# Patient Record
Sex: FEMALE | Race: WHITE | Employment: PART TIME | URBAN - METROPOLITAN AREA
[De-identification: names, ages, dates, MRNs, and addresses within clinical notes are randomized per-mention and may not be internally consistent; named-entity substitution may affect disease eponyms.]

---

## 2017-05-10 ENCOUNTER — HOSPITAL ENCOUNTER (OUTPATIENT)
Dept: VASCULAR LAB | Age: 67
Discharge: HOME | End: 2017-05-10
Payer: MEDICARE

## 2017-05-10 PROCEDURE — 93926 LOWER EXTREMITY STUDY: CPT

## 2017-05-10 PROCEDURE — 93924 LWR XTR VASC STDY BILAT: CPT

## 2018-01-05 ENCOUNTER — HOSPITAL ENCOUNTER (OUTPATIENT)
Dept: VASCULAR LAB | Age: 68
Discharge: HOME | End: 2018-01-05
Payer: MEDICARE

## 2018-01-05 PROCEDURE — 93926 LOWER EXTREMITY STUDY: CPT

## 2018-01-05 PROCEDURE — 93923 UPR/LXTR ART STDY 3+ LVLS: CPT

## 2018-01-05 PROCEDURE — 93880 EXTRACRANIAL BILAT STUDY: CPT

## 2018-02-20 ENCOUNTER — HOSPITAL ENCOUNTER (EMERGENCY)
Age: 68
Discharge: HOME | End: 2018-02-20
Attending: EMERGENCY MEDICINE
Payer: MEDICARE

## 2018-02-20 ENCOUNTER — APPOINTMENT (OUTPATIENT)
Dept: GENERAL RADIOLOGY | Age: 68
End: 2018-02-20
Payer: MEDICARE

## 2018-02-20 VITALS
HEART RATE: 90 BPM | SYSTOLIC BLOOD PRESSURE: 135 MMHG | BODY MASS INDEX: 37.05 KG/M2 | WEIGHT: 209.13 LBS | HEIGHT: 63 IN | OXYGEN SATURATION: 96 % | RESPIRATION RATE: 18 BRPM | DIASTOLIC BLOOD PRESSURE: 63 MMHG | TEMPERATURE: 97.3 F

## 2018-02-20 DIAGNOSIS — M25.561 ACUTE PAIN OF RIGHT KNEE: Primary | ICD-10-CM

## 2018-02-20 PROCEDURE — 93971 EXTREMITY STUDY: CPT

## 2018-02-20 PROCEDURE — 99284 EMERGENCY DEPT VISIT MOD MDM: CPT

## 2018-02-20 PROCEDURE — 73562 X-RAY EXAM OF KNEE 3: CPT

## 2018-02-20 RX ORDER — METOPROLOL SUCCINATE 50 MG/1
50 TABLET, EXTENDED RELEASE ORAL DAILY
COMMUNITY
End: 2022-03-15

## 2018-02-20 RX ORDER — PIOGLITAZONEHYDROCHLORIDE 45 MG/1
45 TABLET ORAL DAILY
COMMUNITY
End: 2022-03-15

## 2018-02-20 ASSESSMENT — ENCOUNTER SYMPTOMS
COUGH: 1
WHEEZING: 0
COLOR CHANGE: 0
SHORTNESS OF BREATH: 0

## 2018-02-20 ASSESSMENT — PAIN SCALES - GENERAL: PAINLEVEL_OUTOF10: 8

## 2018-02-20 ASSESSMENT — PAIN DESCRIPTION - PAIN TYPE: TYPE: ACUTE PAIN

## 2018-02-20 ASSESSMENT — PAIN DESCRIPTION - ORIENTATION: ORIENTATION: RIGHT;POSTERIOR

## 2018-02-20 ASSESSMENT — PAIN DESCRIPTION - LOCATION: LOCATION: KNEE

## 2018-02-20 ASSESSMENT — PAIN DESCRIPTION - DESCRIPTORS: DESCRIPTORS: STABBING

## 2018-02-20 NOTE — ED NOTES
Pt presents to er with c/o non-productive cough for past 2 weeks. Pt denies sob. Pt denies chest pain. Pt states she has pain to knee, pt denies injury. Pt states she has hx of dvt. Pt a&ox3. Skin warm and dry. Respirations even and non-labored.       Phoebe Wisdom RN  02/20/18 9003

## 2019-02-25 ENCOUNTER — HOSPITAL ENCOUNTER (OUTPATIENT)
Dept: VASCULAR LAB | Age: 69
Discharge: HOME | End: 2019-02-25
Payer: MEDICARE

## 2019-02-25 DIAGNOSIS — I77.9 PERIPHERAL ARTERIAL OCCLUSIVE DISEASE (HCC): Primary | ICD-10-CM

## 2019-02-25 DIAGNOSIS — I65.23 BILATERAL CAROTID ARTERY STENOSIS: ICD-10-CM

## 2019-02-25 PROCEDURE — 93923 UPR/LXTR ART STDY 3+ LVLS: CPT

## 2019-02-25 PROCEDURE — 93926 LOWER EXTREMITY STUDY: CPT

## 2019-02-25 PROCEDURE — 93880 EXTRACRANIAL BILAT STUDY: CPT

## 2020-08-28 ENCOUNTER — HOSPITAL ENCOUNTER (OUTPATIENT)
Dept: VASCULAR LAB | Age: 70
Discharge: HOME | End: 2020-08-28
Payer: MEDICARE

## 2020-08-28 PROCEDURE — 93926 LOWER EXTREMITY STUDY: CPT

## 2020-08-28 PROCEDURE — 93880 EXTRACRANIAL BILAT STUDY: CPT

## 2020-08-28 PROCEDURE — 93923 UPR/LXTR ART STDY 3+ LVLS: CPT

## 2022-01-17 ENCOUNTER — HOSPITAL ENCOUNTER (OUTPATIENT)
Dept: VASCULAR LAB | Age: 72
Discharge: HOME | End: 2022-01-17
Payer: MEDICARE

## 2022-01-17 DIAGNOSIS — I65.23 CAROTID STENOSIS, BILATERAL: ICD-10-CM

## 2022-01-17 DIAGNOSIS — I77.9 PERIPHERAL ARTERIAL OCCLUSIVE DISEASE (HCC): Primary | ICD-10-CM

## 2022-01-17 DIAGNOSIS — I73.9 INTERMITTENT CLAUDICATION (HCC): ICD-10-CM

## 2022-01-17 PROCEDURE — 93925 LOWER EXTREMITY STUDY: CPT

## 2022-01-17 PROCEDURE — 93923 UPR/LXTR ART STDY 3+ LVLS: CPT

## 2022-01-17 PROCEDURE — 93880 EXTRACRANIAL BILAT STUDY: CPT

## 2022-01-17 PROCEDURE — 93922 UPR/L XTREMITY ART 2 LEVELS: CPT

## 2022-03-15 ENCOUNTER — HOSPITAL ENCOUNTER (OUTPATIENT)
Dept: INTERVENTIONAL RADIOLOGY/VASCULAR | Age: 72
Discharge: HOME | End: 2022-03-17
Attending: SURGERY | Admitting: SURGERY
Payer: MEDICARE

## 2022-03-15 VITALS
RESPIRATION RATE: 16 BRPM | WEIGHT: 196 LBS | DIASTOLIC BLOOD PRESSURE: 71 MMHG | HEIGHT: 63 IN | HEART RATE: 86 BPM | SYSTOLIC BLOOD PRESSURE: 139 MMHG | BODY MASS INDEX: 34.73 KG/M2 | TEMPERATURE: 98.1 F | OXYGEN SATURATION: 96 %

## 2022-03-15 DIAGNOSIS — Z98.890: ICD-10-CM

## 2022-03-15 DIAGNOSIS — I73.9 CLAUDICATION (HCC): ICD-10-CM

## 2022-03-15 LAB
BUN BLDV-MCNC: 33 MG/DL (ref 8–23)
CREAT SERPL-MCNC: 1.08 MG/DL (ref 0.5–0.9)
GFR AFRICAN AMERICAN: >60 ML/MIN
GFR NON-AFRICAN AMERICAN: 50 ML/MIN
GFR SERPL CREATININE-BSD FRML MDRD: ABNORMAL ML/MIN/{1.73_M2}
HCT VFR BLD CALC: 42.5 % (ref 36.3–47.1)
HEMOGLOBIN: 14.4 G/DL (ref 11.9–15.1)
INR BLD: 1
MCH RBC QN AUTO: 31.2 PG (ref 25.2–33.5)
MCHC RBC AUTO-ENTMCNC: 33.9 G/DL (ref 28.4–34.8)
MCV RBC AUTO: 92.2 FL (ref 82.6–102.9)
NRBC AUTOMATED: 0 PER 100 WBC
PARTIAL THROMBOPLASTIN TIME: 29.7 SEC (ref 23.9–33.8)
PDW BLD-RTO: 13 % (ref 11.8–14.4)
PLATELET # BLD: 207 K/UL (ref 138–453)
PMV BLD AUTO: 11.1 FL (ref 8.1–13.5)
PROTHROMBIN TIME: 12.9 SEC (ref 11.5–14.2)
RBC # BLD: 4.61 M/UL (ref 3.95–5.11)
WBC # BLD: 8.3 K/UL (ref 3.5–11.3)

## 2022-03-15 PROCEDURE — 99152 MOD SED SAME PHYS/QHP 5/>YRS: CPT

## 2022-03-15 PROCEDURE — 2500000003 HC RX 250 WO HCPCS: Performed by: SURGERY

## 2022-03-15 PROCEDURE — 7100000010 HC PHASE II RECOVERY - FIRST 15 MIN

## 2022-03-15 PROCEDURE — 84520 ASSAY OF UREA NITROGEN: CPT

## 2022-03-15 PROCEDURE — 7100000011 HC PHASE II RECOVERY - ADDTL 15 MIN

## 2022-03-15 PROCEDURE — 99153 MOD SED SAME PHYS/QHP EA: CPT

## 2022-03-15 PROCEDURE — 2580000003 HC RX 258: Performed by: SURGERY

## 2022-03-15 PROCEDURE — 6360000002 HC RX W HCPCS: Performed by: SURGERY

## 2022-03-15 PROCEDURE — C1769 GUIDE WIRE: HCPCS

## 2022-03-15 PROCEDURE — 75625 CONTRAST EXAM ABDOMINL AORTA: CPT

## 2022-03-15 PROCEDURE — 37225 HC FEM POP TERRITORY ATHERECTOMY: CPT

## 2022-03-15 PROCEDURE — 85027 COMPLETE CBC AUTOMATED: CPT

## 2022-03-15 PROCEDURE — 75774 ARTERY X-RAY EACH VESSEL: CPT

## 2022-03-15 PROCEDURE — 75716 ARTERY X-RAYS ARMS/LEGS: CPT

## 2022-03-15 PROCEDURE — 6360000004 HC RX CONTRAST MEDICATION: Performed by: SURGERY

## 2022-03-15 PROCEDURE — 85730 THROMBOPLASTIN TIME PARTIAL: CPT

## 2022-03-15 PROCEDURE — 85610 PROTHROMBIN TIME: CPT

## 2022-03-15 PROCEDURE — 82565 ASSAY OF CREATININE: CPT

## 2022-03-15 RX ORDER — HYDROCODONE BITARTRATE AND ACETAMINOPHEN 5; 325 MG/1; MG/1
1 TABLET ORAL EVERY 4 HOURS PRN
Status: DISCONTINUED | OUTPATIENT
Start: 2022-03-15 | End: 2022-03-18 | Stop reason: HOSPADM

## 2022-03-15 RX ORDER — MORPHINE SULFATE 4 MG/ML
4 INJECTION, SOLUTION INTRAMUSCULAR; INTRAVENOUS
Status: DISCONTINUED | OUTPATIENT
Start: 2022-03-15 | End: 2022-03-18 | Stop reason: HOSPADM

## 2022-03-15 RX ORDER — HEPARIN SODIUM 5000 [USP'U]/ML
INJECTION, SOLUTION INTRAVENOUS; SUBCUTANEOUS
Status: COMPLETED | OUTPATIENT
Start: 2022-03-15 | End: 2022-03-15

## 2022-03-15 RX ORDER — ACETAMINOPHEN 325 MG/1
650 TABLET ORAL EVERY 4 HOURS PRN
Status: DISCONTINUED | OUTPATIENT
Start: 2022-03-15 | End: 2022-03-18 | Stop reason: HOSPADM

## 2022-03-15 RX ORDER — MIDAZOLAM HYDROCHLORIDE 1 MG/ML
INJECTION INTRAMUSCULAR; INTRAVENOUS
Status: COMPLETED | OUTPATIENT
Start: 2022-03-15 | End: 2022-03-15

## 2022-03-15 RX ORDER — MORPHINE SULFATE 2 MG/ML
2 INJECTION, SOLUTION INTRAMUSCULAR; INTRAVENOUS
Status: DISCONTINUED | OUTPATIENT
Start: 2022-03-15 | End: 2022-03-18 | Stop reason: HOSPADM

## 2022-03-15 RX ORDER — FENTANYL CITRATE 50 UG/ML
INJECTION, SOLUTION INTRAMUSCULAR; INTRAVENOUS
Status: COMPLETED | OUTPATIENT
Start: 2022-03-15 | End: 2022-03-15

## 2022-03-15 RX ORDER — IODIXANOL 320 MG/ML
INJECTION, SOLUTION INTRAVASCULAR
Status: COMPLETED | OUTPATIENT
Start: 2022-03-15 | End: 2022-03-15

## 2022-03-15 RX ORDER — HYDROCODONE BITARTRATE AND ACETAMINOPHEN 5; 325 MG/1; MG/1
2 TABLET ORAL EVERY 4 HOURS PRN
Status: DISCONTINUED | OUTPATIENT
Start: 2022-03-15 | End: 2022-03-18 | Stop reason: HOSPADM

## 2022-03-15 RX ORDER — ONDANSETRON 2 MG/ML
4 INJECTION INTRAMUSCULAR; INTRAVENOUS EVERY 8 HOURS PRN
Status: DISCONTINUED | OUTPATIENT
Start: 2022-03-15 | End: 2022-03-18 | Stop reason: HOSPADM

## 2022-03-15 RX ORDER — SODIUM CHLORIDE 450 MG/100ML
INJECTION, SOLUTION INTRAVENOUS CONTINUOUS
Status: DISCONTINUED | OUTPATIENT
Start: 2022-03-15 | End: 2022-03-18 | Stop reason: HOSPADM

## 2022-03-15 RX ORDER — PROTAMINE SULFATE 10 MG/ML
INJECTION, SOLUTION INTRAVENOUS
Status: COMPLETED | OUTPATIENT
Start: 2022-03-15 | End: 2022-03-15

## 2022-03-15 RX ORDER — LIDOCAINE HYDROCHLORIDE 10 MG/ML
INJECTION, SOLUTION INFILTRATION; PERINEURAL
Status: COMPLETED | OUTPATIENT
Start: 2022-03-15 | End: 2022-03-15

## 2022-03-15 RX ORDER — CARVEDILOL 12.5 MG/1
TABLET ORAL
COMMUNITY
Start: 2022-01-24

## 2022-03-15 RX ADMIN — MIDAZOLAM 1 MG: 1 INJECTION INTRAMUSCULAR; INTRAVENOUS at 11:02

## 2022-03-15 RX ADMIN — MIDAZOLAM 1 MG: 1 INJECTION INTRAMUSCULAR; INTRAVENOUS at 11:21

## 2022-03-15 RX ADMIN — PROTAMINE SULFATE 15 MG: 10 INJECTION, SOLUTION INTRAVENOUS at 12:05

## 2022-03-15 RX ADMIN — MIDAZOLAM 1 MG: 1 INJECTION INTRAMUSCULAR; INTRAVENOUS at 11:49

## 2022-03-15 RX ADMIN — MIDAZOLAM 1 MG: 1 INJECTION INTRAMUSCULAR; INTRAVENOUS at 10:27

## 2022-03-15 RX ADMIN — LIDOCAINE HYDROCHLORIDE 5 ML: 10 INJECTION, SOLUTION INFILTRATION; PERINEURAL at 10:27

## 2022-03-15 RX ADMIN — SODIUM CHLORIDE: 4.5 INJECTION, SOLUTION INTRAVENOUS at 08:41

## 2022-03-15 RX ADMIN — Medication 50 MCG: at 11:01

## 2022-03-15 RX ADMIN — HEPARIN SODIUM 5000 UNITS: 5000 INJECTION, SOLUTION INTRAVENOUS; SUBCUTANEOUS at 11:06

## 2022-03-15 RX ADMIN — Medication 50 MCG: at 11:49

## 2022-03-15 RX ADMIN — Medication 50 MCG: at 12:00

## 2022-03-15 RX ADMIN — Medication 50 MCG: at 11:21

## 2022-03-15 RX ADMIN — IODIXANOL 100 ML: 320 INJECTION, SOLUTION INTRAVASCULAR at 10:52

## 2022-03-15 RX ADMIN — MIDAZOLAM 1 MG: 1 INJECTION INTRAMUSCULAR; INTRAVENOUS at 12:00

## 2022-03-15 RX ADMIN — Medication 50 MCG: at 10:27

## 2022-03-15 ASSESSMENT — PAIN SCALES - GENERAL
PAINLEVEL_OUTOF10: 0

## 2022-03-15 ASSESSMENT — PAIN - FUNCTIONAL ASSESSMENT: PAIN_FUNCTIONAL_ASSESSMENT: 0-10

## 2022-03-15 NOTE — PRE SEDATION
Sedation Pre-Procedure Note    Patient Name: Radha Houser   YOB: 1950  Room/Bed: Room/bed info not found  Medical Record Number: [id number]  Date: 3/15/2022   Time: 10:24 AM       Indication: Disabling left lower extremity claudication    Consent: I have discussed with the patient and/or the patient representative the indication, alternatives, and the possible risks and/or complications of the planned procedure and the anesthesia methods. The patient and/or patient representative appear to understand and agree to proceed. Vital Signs:   Vitals:    03/15/22 0819   BP: (!) 149/71   Pulse: 88   Resp: 18   Temp: 97.5 °F (36.4 °C)   SpO2: 93%       Past Medical History:   has a past medical history of Arthritis, Diabetes mellitus ([facility] [address] DVT (deep venous thrombosis) ([facility] [address] Goiter, Hyperlipidemia, and Hypertension. Past Surgical History:   has a past surgical history that includes Appendectomy; Cholecystectomy; Thyroidectomy, partial; ovarian cyst removal (Right); femoral bypass; Dilation and curettage of uterus; other surgical history (9/17/2015); other surgical history; other surgical history (10/25/2016); and Cataract removal with implant (Bilateral). Medications:   Scheduled Meds:   Continuous Infusions:    sodium chloride 100 mL/hr at 03/15/22 0841     PRN Meds:   Home Meds:   Prior to Admission medications    Medication Sig Start Date End Date Taking? Authorizing Provider   Dulaglutide (TRULICITY SC) Inject into the skin once a week   Yes Historical Provider, MD   INSULIN GLARGINE, 1 UNIT DIAL, SC Inject into the skin   Yes Historical Provider, MD   Insulin Glargine (BASAGLAR KWIKPEN SC) Inject 28 Units into the skin daily   Yes Historical Provider, MD   FENOFIBRATE PO Take 134 mg by mouth nightly. Yes Historical Provider, MD   Atorvastatin Calcium (LIPITOR PO) Take 10 mg by mouth nightly. Yes Historical Provider, MD   GLIMEPIRIDE PO Take 1 tablet by mouth 2 times daily.  1 mg tablet   Yes Historical Provider, MD   SPIRONOLACTONE PO Take 25 mg by mouth 2 times daily. Yes Historical Provider, MD   carvedilol (COREG) 12.5 MG tablet  1/24/22   Historical Provider, MD   apixaban (ELIQUIS) 5 MG TABS tablet Take 1 tablet by mouth 2 times daily 6/28/16   Basilio Addison MD   glucose (GLUTOSE) 40 % GEL Take 15 g by mouth as needed. 9/16/14   Nasir Maguire MD   clopidogrel (PLAVIX) 75 MG tablet Take 75 mg by mouth daily. Historical Provider, MD     Coumadin Use Last 7 Days:  no  Antiplatelet drug therapy use last 7 days: no  Other anticoagulant use last 7 days: no  Additional Medication Information:        Pre-Sedation Documentation and Exam:   Vital signs have been reviewed (see flow sheet for vitals).     Mallampati Airway Assessment:  Mallampati Class II - (soft palate, fauces & uvula are visible)    Prior History of Anesthesia Complications:   none    ASA Classification:  Class 2 - A normal healthy patient with mild systemic disease    Sedation/ Anesthesia Plan:   intravenous sedation    Medications Planned:   midazolam (Versed) intravenously and fentanyl intravenously    Patient is an appropriate candidate for plan of sedation: yes    Electronically signed by Basilio Addison MD on 3/15/2022 at 10:24 AM

## 2022-03-15 NOTE — BRIEF OP NOTE
Brief Postoperative Note      Patient: Edis Sanders  YOB: 1950  MRN: [id number]    Date of Procedure: 3/15/2022    Pre-Op Diagnosis: Disabling left lower extremity claudication    Post-Op Diagnosis: Same       Procedure: Aortogram with selective bilateral lower extremity runoff (third order selective)  Left superficial femoral and proximal popliteal artery atherectomy with jetstream 2.4 mm catheter  Left superficial femoral and proximal popliteal artery DCB angioplasty with 5 mm x 150 mm and 6 mm x 150 mm [place] drug-coated balloons  Left superficial femoral and proximal popliteal artery angioplasty with a 6 mm x 220 mm Harsha balloon  Ultrasound-guided access right common femoral artery  Conscious sedation    Surgeon:Reynaldo Strickland MD    Assistant:  * No surgical staff found *    Anesthesia: Local/IV sedation    Estimated Blood Loss (mL): Minimal    Complications: None    Specimens:   * No specimens in log *    Implants:  * No implants in log *      Drains: * No LDAs found *    Findings: 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     Electronically signed by Suellen Valentine MD on 3/15/2022 at 12:06 PM

## 2022-03-15 NOTE — H&P
Interval H&P Note    Pt Name: Magnolia Syed  MRN: [id number]  [place]: 1950  Date of evaluation: 3/15/2022      [x] I have reviewed the hardcopy Vascular Note by Dr Sarah Moya dated 2/28/22 labeled in short chart for an Interval History and Physical note. [x] I have examined  Magnolia Syed  There are no changes to the patient who is scheduled for a aortogram left lower extremity with possible arthrectomy DCB angioplasty if amendable by Dr Sarah Moya for claudication. The patient denies new health changes, fever, chills, wheezing, cough, increased SOB, chest pain, open sores or wounds. +DM Sugars run 200's Wearing dwayne sensor device left upper arm. Patient on dual anticoagulant therapy for Hx DVT. Last Eliquis 3/11/22 and Plavix 3/9/22    Past Medical History:     Past Medical History:   Diagnosis Date    Arthritis     Diabetes mellitus ([address]     DVT (deep venous thrombosis) (HCC)     Goiter     Hyperlipidemia     Hypertension       Past Surgical History:     Past Surgical History:   Procedure Laterality Date    APPENDECTOMY      CATARACT REMOVAL WITH IMPLANT Bilateral     CHOLECYSTECTOMY      DILATION AND CURETTAGE OF UTERUS      FEMORAL BYPASS      OTHER SURGICAL HISTORY  9/17/2015    abdominal aortagram with runoff and stent right internal iliac    OTHER SURGICAL HISTORY      left femoral approach to right leg arteriogram    OTHER SURGICAL HISTORY  10/25/2016    angiogram with angioplasty to RLE CFA, graft and prox popliteal     OVARIAN CYST REMOVAL Right     THYROIDECTOMY, PARTIAL        Social History:     Tobacco:    reports that she has quit smoking. She has never used smokeless tobacco.  Alcohol:      reports no history of alcohol use. Drug Use:  reports no history of drug use. Family History:     History reviewed. No pertinent family history.     Vital signs: BP (!) 149/71   Pulse 88   Temp 97.5 °F (36.4 °C) (Temporal)   Resp 18   Ht 5' 3\" (1.6 m)   Wt 196 lb (88.9 kg)   SpO2 93%   BMI 34.72 kg/m²     Allergies:  Lisinopril, Tetracyclines & related, Amlodipine besylate, Ceftin [cefuroxime axetil], Darvocet a500 [propoxyphene n-acetaminophen], Flexeril [cyclobenzaprine], Neurontin [gabapentin], Septra [sulfamethoxazole-trimethoprim], and Codeine    Medications:    Prior to Admission medications    Medication Sig Start Date End Date Taking? Authorizing Provider   Dulaglutide (TRULICITY SC) Inject into the skin once a week   Yes Historical Provider, MD   INSULIN GLARGINE, 1 UNIT DIAL, SC Inject into the skin   Yes Historical Provider, MD   Insulin Glargine (BASAGLAR KWIKPEN SC) Inject 28 Units into the skin daily   Yes Historical Provider, MD   FENOFIBRATE PO Take 134 mg by mouth nightly. Yes Historical Provider, MD   Atorvastatin Calcium (LIPITOR PO) Take 10 mg by mouth nightly. Yes Historical Provider, MD   GLIMEPIRIDE PO Take 1 tablet by mouth 2 times daily. 1 mg tablet   Yes Historical Provider, MD   SPIRONOLACTONE PO Take 25 mg by mouth 2 times daily. Yes Historical Provider, MD   carvedilol (COREG) 12.5 MG tablet  1/24/22   Historical Provider, MD   apixaban (ELIQUIS) 5 MG TABS tablet Take 1 tablet by mouth 2 times daily 6/28/16   Ariana Smiley MD   glucose (GLUTOSE) 40 % GEL Take 15 g by mouth as needed. 9/16/14   Dawood Almonte MD   clopidogrel (PLAVIX) 75 MG tablet Take 75 mg by mouth daily. Historical Provider, MD         This is a 70 y.o. female who is pleasant, cooperative, alert and oriented x3, in no acute distress    Physical Exam:  Lungs: Bilateral equal air entry, unlabored, clear to ausculation, no wheezing, rales or rhonchi, normal effort  Cardiovascular: HR 88 normal rate, regular rhythm, no murmur, gallop, rub. Abdomen: Soft, nontender, nondistended, normal bowel sounds. Extremities: dwayne sensor left upper arm No calf tenderness or peripheral edema Pulses noted.   Skin: \"ring worm on left lower leg covered with Band-Aid\"currently on tx by dermatology   Labs:  Recent Labs     03/15/22  0831   HGB 14.4   HCT 42.5   WBC 8.3   MCV 92.2      BUN 33*   CREATININE 1.08*   INR 1.0   PROTIME 12.9   APTT 29.7       No results for input(s): COVID19 in the last 720 hours.     PARRIS Hagen CNP   Electronically signed 3/15/2022 at 8:51 AM

## 2022-03-15 NOTE — FLOWSHEET NOTE
Patient arrived to room 2038 via stretcher from recovery s/p angiogram. Patient is alert and oriented and denies pain. Stretcher locked and placed in lowest position. Side rails up x2. Call light placed at the patient's bedside. Right femoral groin assessed. No redness, drainage, swelling, or hematoma present. Dressing remains clean, dry and intact. Pulses palpable bilaterally. Vital signs obtained (see flowsheet). RN educated the patient on position orders and activity restrictions. Plan for patient to be discharged this evening. Patient verbalizes understanding. Will continue to monitor closely.

## 2022-03-15 NOTE — POST SEDATION
Sedation Post Procedure Note    Patient Name: Josesito Wyatt   YOB: 1950  Room/Bed: Room/bed info not found  Medical Record Number: [id number]  Date: 3/15/2022   Time: 12:09 PM         Physicians/Assistants: Clotilde Vergara MD, MD    Procedure Performed:    Aortogram with selective bilateral lower extremity runoff (third order selective)  Left superficial femoral and proximal popliteal artery atherectomy with jetstream 2.4 mm catheter  Left superficial femoral and proximal popliteal artery DCB angioplasty with 5 mm x 150 mm and 6 mm x 150 mm [place] drug-coated balloons  Left superficial femoral and proximal popliteal artery angioplasty with a 6 mm x 220 mm Harsha balloon  Ultrasound-guided access right common femoral artery  Conscious sedation    Post-Sedation Vital Signs:  Vitals:    03/15/22 1205   BP: (!) 167/87   Pulse: 73   Resp: 14   Temp:    SpO2: 96%      Vital signs were reviewed and were stable after the procedure (see flow sheet for vitals)            Post-Sedation Exam: Lungs: clear to auscultation bilaterally and Cardiovascular: regular rate and rhythm           Complications: none    Electronically signed by Clotilde Vergara MD on 3/15/2022 at 12:09 PM

## 2022-03-16 NOTE — OP NOTE
[id number] [facility] [address]                [address] [place] [address]                                OPERATIVE REPORT    PATIENT NAME: Zbigniew Blanco                   :        1950  MED REC NO:   [id number]                             ROOM:  ACCOUNT NO:   [de-identified]                           ADMIT DATE: 03/15/2022  PROVIDER:     Clotilde Vergara MD    DATE OF PROCEDURE:  03/15/2022    PREOPERATIVE DIAGNOSIS:  Disabling left lower extremity claudication. POSTOPERATIVE DIAGNOSIS:  Disabling left lower extremity claudication. PROCEDURES:  1. Aortogram with selective bilateral lower extremity runoff (third  order selective). 2.  Left superficial femoral and proximal popliteal artery atherectomy  with Jetstream 2.4 mm catheter. 3.  Left superficial femoral and proximal popliteal artery DCB  angioplasty with 5 mm x 150 mm and 6 mm x 150 mm [place] drug-coated  balloons x2.  4.  Left superficial femoral and proximal popliteal artery angioplasty  with 6 mm x 220 mm Harsha balloon. 5.  Ultrasound-guided access, right common femoral artery. 6.  Conscious sedation. SURGEON:  Clotilde Vergara MD    ANESTHESIA:  Local IV sedation. ESTIMATED BLOOD LOSS:  Minimal.    COMPLICATIONS:  None. PREOPERATIVE INDICATIONS:  The patient is a 75-year-old female who  presents with disabling left lower extremity claudication. She  previously underwent a right femoral to above-knee popliteal artery  bypass and has been doing well. At this time, she is being taken to the  angiography suite for further evaluation and therapy. OPERATIVE TECHNIQUE:  After informed consent had been obtained, the  patient was brought to the angiography suite, and placed in supine  position, and both groins were prepped and draped in the usual sterile  fashion. Conscious sedation was initiated by trained personnel using  appropriate monitoring equipment.   Right groin was anesthetized with 1%  Xylocaine. Under ultrasound guidance, right common femoral artery was  isolated. 5-Malian sheath was placed. 5-Malian SOS Omni Flush catheter  was advanced into the suprarenal aorta using 50% diluted Visipaque 320  contrast and digital imaging, abdominal aortography was performed. Catheter was then turned to the aortic bifurcation. Pelvic angiography  was performed in the MAHAN and [place]ian projections. Lower extremity runoff  views were then obtained along with selective views of the left lower  leg from the popliteal injection. This demonstrated normal functioning  right lower extremity bypass graft, which appeared widely patent. In  the left side, there was chronic total occlusion of the left superficial  femoral artery with reconstitution at the adductor canal with areas of  stenosis noted extending into the proximal popliteal artery. Distally,  two-vessel runoff was present bilaterally. At this time, the chronic  total occlusion was crossed and a 0.035 stiff angled Glidewire was  advanced. [place] catheter was advanced distally and contrast  angiography was performed confirming placement within the true lumen. A  7-Malian Baxter destination sheath was then placed. The patient was  intravenously heparinized with 5000 units of aqueous heparin. Guidewire  was exchanged for a 0.014 [employer] guidewire, and atherectomy was carried  out of the left superficial femoral and proximal popliteal artery using  a Jetstream 2.4 mm catheter with blades down only. Completion  angiography demonstrated continued high-grade stenoses and a [place] 5 mm  x 150 mm and 6 mm x 150 mm drug-coated balloons were advanced into the  left superficial femoral and proximal popliteal artery and sequentially  inflated. Completion angiography continued to demonstrate an area of  stenosis at the midportion. A 6 mm x 220 mm Harsha balloon was  advanced, and the area was sequentially inflated.   Completion  angiography demonstrated complete resolution of the previous stenosis  with excellent flow into the left lower leg. Catheters and guidewires  were then withdrawn followed by the introducer sheath. 15 mg of  protamine was given intravenously. Manual pressure applied to right  groin followed by a sterile dressing. The patient tolerated the  procedure well, and there are no immediate complications. IMPRESSIONS:  1. Widely patent, normal functioning right lower extremity bypass graft  with two-vessel runoff to the right foot as noted above. 2.  Chronic total occlusion of the left superficial femoral and proximal  popliteal artery with resolution of post atherectomy DCB angioplasty  with two-vessel runoff to the left foot. 3.  Essentially normal aorta with widely patent renal arteries. The above discussed with the patient and she was restarted on Eliquis  and Plavix daily.         Luis Espitia MD    D: 03/15/2022 12:19:35       T: 03/15/2022 12:22:21     [email or web address]  Job#: [id number]     Doc#: [id number]    CC:  Shaheed Zuluaga MD       Family Physician

## 2023-03-09 ENCOUNTER — HOSPITAL ENCOUNTER (EMERGENCY)
Age: 73
Discharge: HOME | End: 2023-03-09
Attending: EMERGENCY MEDICINE
Payer: MEDICARE

## 2023-03-09 ENCOUNTER — APPOINTMENT (OUTPATIENT)
Dept: CT IMAGING | Age: 73
End: 2023-03-09
Payer: MEDICARE

## 2023-03-09 VITALS
HEIGHT: 63 IN | SYSTOLIC BLOOD PRESSURE: 164 MMHG | TEMPERATURE: 98.3 F | BODY MASS INDEX: 34.91 KG/M2 | RESPIRATION RATE: 20 BRPM | OXYGEN SATURATION: 94 % | HEART RATE: 90 BPM | DIASTOLIC BLOOD PRESSURE: 84 MMHG | WEIGHT: 197 LBS

## 2023-03-09 DIAGNOSIS — M54.42 LOW BACK PAIN WITH LEFT-SIDED SCIATICA, UNSPECIFIED BACK PAIN LATERALITY, UNSPECIFIED CHRONICITY: Primary | ICD-10-CM

## 2023-03-09 DIAGNOSIS — R93.7 ABNORMAL CT SCAN, LUMBAR SPINE: ICD-10-CM

## 2023-03-09 PROCEDURE — 96372 THER/PROPH/DIAG INJ SC/IM: CPT

## 2023-03-09 PROCEDURE — 72131 CT LUMBAR SPINE W/O DYE: CPT

## 2023-03-09 PROCEDURE — 99284 EMERGENCY DEPT VISIT MOD MDM: CPT

## 2023-03-09 PROCEDURE — 6360000002 HC RX W HCPCS: Performed by: NURSE PRACTITIONER

## 2023-03-09 RX ORDER — TIZANIDINE 2 MG/1
2 TABLET ORAL EVERY 8 HOURS PRN
Qty: 21 TABLET | Refills: 0 | Status: SHIPPED | OUTPATIENT
Start: 2023-03-09

## 2023-03-09 RX ORDER — KETOROLAC TROMETHAMINE 30 MG/ML
30 INJECTION, SOLUTION INTRAMUSCULAR; INTRAVENOUS ONCE
Status: COMPLETED | OUTPATIENT
Start: 2023-03-09 | End: 2023-03-09

## 2023-03-09 RX ORDER — PREDNISONE 10 MG/1
TABLET ORAL
Qty: 30 TABLET | Refills: 0 | Status: SHIPPED | OUTPATIENT
Start: 2023-03-09

## 2023-03-09 RX ORDER — DEXAMETHASONE SODIUM PHOSPHATE 10 MG/ML
8 INJECTION, SOLUTION INTRAMUSCULAR; INTRAVENOUS ONCE
Status: COMPLETED | OUTPATIENT
Start: 2023-03-09 | End: 2023-03-09

## 2023-03-09 RX ORDER — HYDROCODONE BITARTRATE AND ACETAMINOPHEN 5; 325 MG/1; MG/1
1 TABLET ORAL EVERY 8 HOURS PRN
Qty: 12 TABLET | Refills: 0 | Status: SHIPPED | OUTPATIENT
Start: 2023-03-09 | End: 2023-03-13

## 2023-03-09 RX ADMIN — KETOROLAC TROMETHAMINE 30 MG: 30 INJECTION, SOLUTION INTRAMUSCULAR; INTRAVENOUS at 19:26

## 2023-03-09 RX ADMIN — DEXAMETHASONE SODIUM PHOSPHATE 8 MG: 10 INJECTION, SOLUTION INTRAMUSCULAR; INTRAVENOUS at 19:25

## 2023-03-09 ASSESSMENT — ENCOUNTER SYMPTOMS
VOMITING: 0
SHORTNESS OF BREATH: 0
BACK PAIN: 1
ABDOMINAL PAIN: 0
COLOR CHANGE: 0
NAUSEA: 0
DIARRHEA: 0

## 2023-03-09 ASSESSMENT — PAIN SCALES - GENERAL: PAINLEVEL_OUTOF10: 9

## 2023-03-10 NOTE — DISCHARGE INSTRUCTIONS
Zanaflex and/or norco:  WARNING:  May cause drowsiness. May impair ability to operate vehicles or machinery. Do not use in combination with alcohol. CT LUMBAR SPINE WO CONTRAST (Final result)  Result time 03/09/23 19:58:23  Final result by Fernando Glez MD (03/09/23 19:58:23)                Impression:    1. No acute abnormality. 2. Faint lucent lesions in the lower lumbar spine and left pelvis,   indeterminate. MRI is recommended for further evaluation. 3. Degenerative change in the lower lumbar spine with moderate spinal canal   narrowing at L4-5 and mild to moderate spinal canal narrowing at L5-S1. There is neural foraminal narrowing as above. 4. Bilateral nonobstructing nephrolithiasis. Partially visualized right   renal cysts. Narrative:    EXAMINATION:   CT OF THE LUMBAR SPINE WITHOUT CONTRAST  3/9/2023     TECHNIQUE:   CT of the lumbar spine was performed without the administration of   intravenous contrast. Multiplanar reformatted images are provided for review. Adjustment of mA and/or kV according to patient size was utilized. Automated   exposure control, iterative reconstruction, and/or weight based adjustment of   the mA/kV was utilized to reduce the radiation dose to as low as reasonably   achievable. COMPARISON:   None     HISTORY:   ORDERING SYSTEM PROVIDED HISTORY: pain, radiates down left leg   TECHNOLOGIST PROVIDED HISTORY:   pain, radiates down left leg   Decision Support Exception - unselect if not a suspected or confirmed   emergency medical condition->Emergency Medical Condition (MA)   Reason for Exam: pain, radiates down left leg     FINDINGS:   BONES/ALIGNMENT: There are 5 lumbar vertebral bodies. Lumbar spinal   alignment is maintained. Vertebral body heights are maintained. There are   no acute fractures. There some indeterminate lucent lesions in the lower   lumbar vertebral bodies and left iliac bone.      DEGENERATIVE CHANGES: Degenerative change in the lower lumbar spine with   moderate spinal canal stenosis at L4-5 and mild to moderate spinal canal   stenosis at L5-S1. There is mild bilateral neural foraminal narrowing at   L3-4. There is moderate bilateral neural foraminal narrowing at L4-5 and   L5-S1. SOFT TISSUES/RETROPERITONEUM: Bilateral nonobstructing nephrolithiasis is   noted. No hydronephrosis. There is a partially visualized right renal   lesion, which appears to be cystic. Atherosclerosis of the nondilated   abdominal aorta.

## 2023-03-10 NOTE — ED PROVIDER NOTES
[address] ED  eMERGENCY dEPARTMENT eNCOUnter      Pt Name: Erick Noyola  MRN: [id number]  [place] 1950  Date of evaluation: 3/9/2023  Provider: PARRIS Burt CNP    CHIEF COMPLAINT       Chief Complaint   Patient presents with    Back Pain         HISTORY OF PRESENT ILLNESS  (Location/Symptom, Timing/Onset, Context/Setting, Quality, Duration, Modifying Factors, Severity.)   Erick Noyola is a 67 y.o. female who presents to the emergency department. C/o pain to her left lower back. It radiates down her left leg. Onset was approx 8 months ago. It has been getting worse. She has been taking Tylenol. Denies previous evaluation for the issue. The sx became worse after jumping down 4 steps off of her porch last summer. Denies change in bowel and/or bladder control. Denies saddle anesthesia. Denies fever, chills, urinary sx, N/T to her extremities. Rates her pain 8/10 at this time. She is driving herself today. Nursing Notes were reviewed.     ALLERGIES     Lisinopril, Tetracyclines & related, Amlodipine besylate, Ceftin [cefuroxime axetil], Darvocet a500 [propoxyphene n-acetaminophen], Flexeril [cyclobenzaprine], Neurontin [gabapentin], Septra [sulfamethoxazole-trimethoprim], and Codeine    CURRENT MEDICATIONS       Discharge Medication List as of 3/9/2023  8:36 PM        CONTINUE these medications which have NOT CHANGED    Details   insulin lispro (HUMALOG) 100 UNIT/ML injection vial Inject 46 Units into the skin 3 times daily (before meals)Historical Med      carvedilol (COREG) 12.5 MG tablet Historical Med      Dulaglutide (TRULICITY SC) Inject into the skin once a weekHistorical Med      INSULIN GLARGINE, 1 UNIT DIAL, SC Inject into the skinHistorical Med      Insulin Glargine (BASAGLAR KWIKPEN SC) Inject 28 Units into the skin dailyHistorical Med      apixaban (ELIQUIS) 5 MG TABS tablet Take 1 tablet by mouth 2 times daily, Disp-60 tablet, R-0      glucose (GLUTOSE) 40 % GEL Take 15 g by mouth as needed. , Disp-45 g, R-1      FENOFIBRATE PO Take 134 mg by mouth nightly. Atorvastatin Calcium (LIPITOR PO) Take 10 mg by mouth nightly. GLIMEPIRIDE PO Take 1 tablet by mouth 2 times daily. 1 mg tablet      clopidogrel (PLAVIX) 75 MG tablet Take 75 mg by mouth daily. SPIRONOLACTONE PO Take 25 mg by mouth 2 times daily. PAST MEDICAL HISTORY         Diagnosis Date    Arthritis     Diabetes mellitus ([address]     DVT (deep venous thrombosis) ([facility] [address]     Goiter     Hyperlipidemia     Hypertension        SURGICAL HISTORY           Procedure Laterality Date    APPENDECTOMY      CATARACT EXTRACTION W/  INTRAOCULAR LENS IMPLANT Bilateral     CHOLECYSTECTOMY      DILATION AND CURETTAGE OF UTERUS      FEMORAL BYPASS      OTHER SURGICAL HISTORY  9/17/2015    abdominal aortagram with runoff and stent right internal iliac    OTHER SURGICAL HISTORY      left femoral approach to right leg arteriogram    OTHER SURGICAL HISTORY  10/25/2016    angiogram with angioplasty to RLE CFA, graft and prox popliteal     OVARIAN CYST REMOVAL Right     THYROIDECTOMY, PARTIAL           FAMILY HISTORY     No family history on file. No family status information on file. SOCIAL HISTORY      reports that she has quit smoking. She has never used smokeless tobacco. She reports that she does not drink alcohol and does not use drugs. REVIEW OF SYSTEMS    (2-9 systems for level 4, 10 or more for level 5)     Review of Systems   Respiratory:  Negative for shortness of breath. Cardiovascular:  Negative for chest pain. Gastrointestinal:  Negative for abdominal pain, diarrhea, nausea and vomiting. Genitourinary:  Negative for difficulty urinating, dysuria, flank pain, frequency, hematuria and urgency. Musculoskeletal:  Positive for arthralgias, back pain and myalgias. Negative for gait problem and neck pain. Skin:  Negative for color change, rash and wound.    Neurological:  Negative for weakness, numbness and headaches. Except as noted above the remainder of the review of systems was reviewed and negative. PHYSICAL EXAM    (up to 7 for level 4, 8 or more for level 5)     ED Triage Vitals [03/09/23 1852]   BP Temp Temp src Heart Rate Resp SpO2 Height Weight   (!) 164/84 98.3 °F (36.8 °C) -- 90 20 94 % 5' 3\" (1.6 m) 197 lb (89.4 kg)     Physical Exam  Vitals reviewed. Constitutional:       General: She is not in acute distress. Appearance: She is well-developed. She is not diaphoretic. Eyes:      General: No scleral icterus. Conjunctiva/sclera: Conjunctivae normal.   Cardiovascular:      Rate and Rhythm: Normal rate. Pulses: Normal pulses. Pulmonary:      Effort: Pulmonary effort is normal. No respiratory distress. Breath sounds: No stridor. No rales. Musculoskeletal:      Cervical back: Neck supple. No swelling, deformity, tenderness or bony tenderness. Thoracic back: No swelling, deformity, tenderness or bony tenderness. Lumbar back: Tenderness present. No swelling, edema or deformity. Decreased range of motion. Back:    Skin:     General: Skin is warm and dry. Capillary Refill: Capillary refill takes less than 2 seconds. Findings: No rash. Neurological:      Mental Status: She is alert and oriented to person, place, and time. GCS: GCS eye subscore is 4. GCS verbal subscore is 5. GCS motor subscore is 6. Motor: Motor function is intact. Coordination: Coordination is intact.    Psychiatric:         Behavior: Behavior normal.        DIAGNOSTIC RESULTS     RADIOLOGY:   Non-plain film images such as CT, Ultrasound and MRI are read by the radiologist. Plain radiographic images are visualized and preliminarily interpreted by the emergency physician with the below findings:    Interpretation per the Radiologist below, if available at the time of this note:    CT LUMBAR SPINE WO CONTRAST    Result Date: 3/9/2023  EXAMINATION: CT OF THE LUMBAR SPINE WITHOUT CONTRAST  3/9/2023 TECHNIQUE: CT of the lumbar spine was performed without the administration of intravenous contrast. Multiplanar reformatted images are provided for review. Adjustment of mA and/or kV according to patient size was utilized. Automated exposure control, iterative reconstruction, and/or weight based adjustment of the mA/kV was utilized to reduce the radiation dose to as low as reasonably achievable. COMPARISON: None HISTORY: ORDERING SYSTEM PROVIDED HISTORY: pain, radiates down left leg TECHNOLOGIST PROVIDED HISTORY: pain, radiates down left leg Decision Support Exception - unselect if not a suspected or confirmed emergency medical condition->Emergency Medical Condition (MA) Reason for Exam: pain, radiates down left leg FINDINGS: BONES/ALIGNMENT: There are 5 lumbar vertebral bodies. Lumbar spinal alignment is maintained. Vertebral body heights are maintained. There are no acute fractures. There some indeterminate lucent lesions in the lower lumbar vertebral bodies and left iliac bone. DEGENERATIVE CHANGES: Degenerative change in the lower lumbar spine with moderate spinal canal stenosis at L4-5 and mild to moderate spinal canal stenosis at L5-S1. There is mild bilateral neural foraminal narrowing at L3-4. There is moderate bilateral neural foraminal narrowing at L4-5 and L5-S1. SOFT TISSUES/RETROPERITONEUM: Bilateral nonobstructing nephrolithiasis is noted. No hydronephrosis. There is a partially visualized right renal lesion, which appears to be cystic. Atherosclerosis of the nondilated abdominal aorta. 1. No acute abnormality. 2. Faint lucent lesions in the lower lumbar spine and left pelvis, indeterminate. MRI is recommended for further evaluation. 3. Degenerative change in the lower lumbar spine with moderate spinal canal narrowing at L4-5 and mild to moderate spinal canal narrowing at L5-S1. There is neural foraminal narrowing as above.  4. Bilateral nonobstructing nephrolithiasis. Partially visualized right renal cysts. EMERGENCY DEPARTMENT COURSE and DIFFERENTIAL DIAGNOSIS/MDM:   Vitals:    Vitals:    03/09/23 1852   BP: (!) 164/84   Pulse: 90   Resp: 20   Temp: 98.3 °F (36.8 °C)   SpO2: 94%   Weight: 197 lb (89.4 kg)   Height: 5' 3\" (1.6 m)         MEDICATIONS GIVEN IN THE ED:  Medications   dexamethasone (PF) (DECADRON) injection 8 mg (8 mg IntraMUSCular Given 3/9/23 1925)   ketorolac (TORADOL) injection 30 mg (30 mg IntraMUSCular Given 3/9/23 1926)       CLINICAL DECISION MAKING:  The patient presented alert with a nontoxic appearance and was seen in conjunction with Dr. Christine Lesches. I will order a CT scan of the lumbar spine. The patient was involved in her plan of care through shared decision making. The testing that was ordered was discussed with the patient. Any medications that may have been ordered were discussed with the patient. I have reviewed the patient's previous medical records using the electronic health record that we have available that were pertinent to today's visit. Imaging was reviewed and reported by the radiologist. Results showed:  1. No acute abnormality. 2. Faint lucent lesions in the lower lumbar spine and left pelvis, indeterminate. MRI is recommended for further evaluation. 3. Degenerative change in the lower lumbar spine with moderate spinal canal narrowing at L4-5 and mild to moderate spinal canal narrowing at L5-S1. There is neural foraminal narrowing as above. 4. Bilateral nonobstructing nephrolithiasis. Partially visualized right renal cysts. Findings were discussed with the patient. She was given medication here with some improvement. She was driving home which limited her pain treatment options here. She was given a copy of her CT report and encouraged to call her pcp in the morning for follow up. OARRS was reviewed.  Prescriptions were provided for prednisone, Zanaflex, and norco. The patient was advised to not drink alcohol, drive, or operate heavy machinery while taking the Zanaflex and/or norco. Follow up as directed. Evaluation and treatment course in the ED, and plan of care upon discharge was discussed in length with the patient. Patient had no further questions prior to being discharged and was instructed to return to the ED for new or worsening symptoms. Care was provided during an unprecedented national emergency due to the novel coronavirus, Covid-19. FINAL IMPRESSION      1. Low back pain with left-sided sciatica, unspecified back pain laterality, unspecified chronicity    2.  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    S/P aortogram Z98.890         DISPOSITION/PLAN   DISPOSITION Decision To Discharge 03/09/2023 08:31:17 PM      PATIENT REFERRED TO:   Rivera Gonsalez  [address] [place], [place] [address] [facility]  [phone]    Schedule an appointment as soon as possible for a visit       [facility] ED  [address] [facility]  [phone]    If symptoms worsen, As needed    DISCHARGE MEDICATIONS:     Discharge Medication List as of 3/9/2023  8:36 PM        START taking these medications    Details   predniSONE (DELTASONE) 10 MG tablet Take five tablets on days 1-2, four tablets on days 3-4, three tablets on days 5-6, two tablets on day 7-8, one tablet on days 9-10., Disp-30 tablet, R-0Print      tiZANidine (ZANAFLEX) 2 MG tablet Take 1 tablet by mouth every 8 hours as needed (back pain), Disp-21 tablet, R-0Print      HYDROcodone-acetaminophen (NORCO) 5-325 MG per tablet Take 1 tablet by mouth every 8 hours as needed for Pain for up to 4 days.  Max Daily Amount: 3 tablets, Disp-12 tablet, R-0Print                 (Please note that portions of this note were completed with a voice recognition program.  Efforts were made to edit the dictations but occasionally words are mis-transcribed.)    PARRIS Finnegan CNP, APRN - CNP  03/09/23 3127

## 2023-03-15 NOTE — ED PROVIDER NOTES
eMERGENCY dEPARTMENT eNCOUnter   [address] [place] Name: Curt Tompkins  MRN: [id number]  [place] 1950  Date of evaluation: 3/15/23     Curt Tompkins is a 67 y.o. female with CC: Back Pain        This visit was performed by both a physician and an APC. I performed all aspects of the MDM as documented. The care is provided during an unprecedented national emergency due to the novel coronavirus, COVID 19.     Suma Matthews MD  Attending Emergency Physician            Suma Matthews MD  03/15/23 0669

## 2023-04-02 ENCOUNTER — APPOINTMENT (OUTPATIENT)
Dept: CT IMAGING | Age: 73
End: 2023-04-02
Payer: MEDICARE

## 2023-04-02 ENCOUNTER — HOSPITAL ENCOUNTER (EMERGENCY)
Age: 73
Discharge: HOME | End: 2023-04-03
Attending: STUDENT IN AN ORGANIZED HEALTH CARE EDUCATION/TRAINING PROGRAM
Payer: MEDICARE

## 2023-04-02 VITALS
DIASTOLIC BLOOD PRESSURE: 58 MMHG | WEIGHT: 195 LBS | RESPIRATION RATE: 16 BRPM | SYSTOLIC BLOOD PRESSURE: 126 MMHG | HEART RATE: 86 BPM | OXYGEN SATURATION: 96 % | BODY MASS INDEX: 34.54 KG/M2 | TEMPERATURE: 97.9 F

## 2023-04-02 DIAGNOSIS — M54.32 SCIATICA OF LEFT SIDE: Primary | ICD-10-CM

## 2023-04-02 DIAGNOSIS — M54.17 LUMBOSACRAL RADICULOPATHY: ICD-10-CM

## 2023-04-02 PROCEDURE — 74176 CT ABD & PELVIS W/O CONTRAST: CPT

## 2023-04-02 PROCEDURE — 99284 EMERGENCY DEPT VISIT MOD MDM: CPT

## 2023-04-02 PROCEDURE — 3209999900 CT LUMBAR SPINE TRAUMA RECONSTRUCTION

## 2023-04-02 RX ORDER — OXYCODONE HYDROCHLORIDE AND ACETAMINOPHEN 5; 325 MG/1; MG/1
1 TABLET ORAL EVERY 6 HOURS PRN
Qty: 12 TABLET | Refills: 0 | Status: SHIPPED | OUTPATIENT
Start: 2023-04-02 | End: 2023-04-07

## 2023-04-02 RX ORDER — PREDNISONE 10 MG/1
TABLET ORAL
Qty: 20 TABLET | Refills: 0 | Status: SHIPPED | OUTPATIENT
Start: 2023-04-02

## 2023-04-02 RX ORDER — LIDOCAINE 50 MG/G
1 PATCH TOPICAL DAILY
Qty: 30 PATCH | Refills: 0 | Status: SHIPPED | OUTPATIENT
Start: 2023-04-02

## 2023-04-03 ENCOUNTER — HOSPITAL ENCOUNTER (OUTPATIENT)
Dept: VASCULAR LAB | Age: 73
Discharge: HOME | End: 2023-04-03
Payer: MEDICARE

## 2023-04-03 DIAGNOSIS — M54.32 SCIATICA OF LEFT SIDE: ICD-10-CM

## 2023-04-03 DIAGNOSIS — M54.17 LUMBOSACRAL RADICULOPATHY: ICD-10-CM

## 2023-04-03 PROCEDURE — 93970 EXTREMITY STUDY: CPT

## 2023-04-03 NOTE — DISCHARGE INSTRUCTIONS
Follow-up with your family doctor, shows signs of arthritis in your spine. Call today or tomorrow to follow up with Pierre Hamilton  in 7 days. Use an ice pack or bag filled with ice and apply to the injured area 3 - 4 times a day for 15 - 20 minutes each time. If the injury is older than 3 days, then use a heating pad to help relax the muscles in your back. Use ibuprofen or Tylenol (unless prescribed medications that have Tylenol in it) for pain. You can take over the counter Ibuprofen (advil) tablets (4 tablets every 8 hours or 3 tablets every 6 hours or 2 tablets every 4 hours)    Return to the Emergency Department for inability to move legs, worsening of pain, tingling / loss of sensation, any other care or concern.

## 2023-04-03 NOTE — ED NOTES
Pt came into ED complaining of left back and leg nerve pain for the past 4xmonths. Concern for left lower leg blood clot, tender to touch, 3 cm around that started today.      Joshua Loyola RN  04/02/23 2138

## 2023-04-05 NOTE — ED PROVIDER NOTES
neural foraminal   narrowing. Areas of lucency within the vertebral body and bilateral iliac bones favored   to represent focal fatty marrow change. Routine outpatient MRI of the lumbar   spine recommended for further evaluation. CT LUMBAR SPINE TRAUMA RECONSTRUCTION   Final Result   No acute intra-abdominal pathology identified. Hepatic steatosis. Bilateral nonobstructing nephrolithiasis. Complex cyst suspected of the right kidney measuring up to 4.4 cm. Routine   outpatient renal protocol MRI of the abdomen recommended for further   evaluation. Mild disc height loss at L4-5 and L5-S1. Hypertrophic facet changes most   pronounced at L4-5 and L5-S1 with moderate bilateral neural foraminal   narrowing. Areas of lucency within the vertebral body and bilateral iliac bones favored   to represent focal fatty marrow change. Routine outpatient MRI of the lumbar   spine recommended for further evaluation. LABS: Lab orders shown below, the results are reviewed by myself, and all abnormals are listed below. Labs Reviewed - No data to display    Vitals Reviewed:    Vitals:    04/02/23 2046 04/02/23 2049 04/02/23 2050   BP:   (!) 126/58   Pulse:  86    Resp:  16    Temp: 97.9 °F (36.6 °C)     TempSrc: Oral     SpO2:  96%    Weight:  195 lb (88.5 kg)      MEDICATIONS GIVEN TO PATIENT THIS ENCOUNTER:  Orders Placed This Encounter   Medications    lidocaine (LIDODERM) 5 %     Sig: Place 1 patch onto the skin daily 12 hours on, 12 hours off. Dispense:  30 patch     Refill:  0    predniSONE (DELTASONE) 10 MG tablet     Sig: Take 4 tablets by mouth once daily for 5 days     Dispense:  20 tablet     Refill:  0    oxyCODONE-acetaminophen (PERCOCET) 5-325 MG per tablet     Sig: Take 1 tablet by mouth every 6 hours as needed for Pain for up to 5 days. Intended supply: 5 days.  Take lowest dose possible to manage pain Max Daily Amount: 4 tablets     Dispense:  12 tablet     Refill:

## 2023-04-19 ENCOUNTER — HOSPITAL ENCOUNTER (OUTPATIENT)
Dept: ULTRASOUND IMAGING | Age: 73
Discharge: HOME | End: 2023-04-21
Payer: MEDICARE

## 2023-04-19 DIAGNOSIS — N28.1 RENAL CYST: ICD-10-CM

## 2023-04-19 DIAGNOSIS — N20.0 BILATERAL KIDNEY STONES: ICD-10-CM

## 2023-04-19 PROCEDURE — 76770 US EXAM ABDO BACK WALL COMP: CPT

## 2023-08-12 ENCOUNTER — APPOINTMENT (OUTPATIENT)
Dept: CT IMAGING | Age: 73
End: 2023-08-12
Payer: MEDICARE

## 2023-08-12 ENCOUNTER — HOSPITAL ENCOUNTER (EMERGENCY)
Age: 73
Discharge: HOME | End: 2023-08-12
Attending: EMERGENCY MEDICINE
Payer: MEDICARE

## 2023-08-12 ENCOUNTER — APPOINTMENT (OUTPATIENT)
Dept: GENERAL RADIOLOGY | Age: 73
End: 2023-08-12
Payer: MEDICARE

## 2023-08-12 VITALS
DIASTOLIC BLOOD PRESSURE: 77 MMHG | BODY MASS INDEX: 34.55 KG/M2 | TEMPERATURE: 98.2 F | HEART RATE: 96 BPM | OXYGEN SATURATION: 95 % | RESPIRATION RATE: 14 BRPM | SYSTOLIC BLOOD PRESSURE: 116 MMHG | HEIGHT: 63 IN | WEIGHT: 195 LBS

## 2023-08-12 DIAGNOSIS — M54.42 CHRONIC LEFT-SIDED LOW BACK PAIN WITH LEFT-SIDED SCIATICA: Primary | ICD-10-CM

## 2023-08-12 DIAGNOSIS — G89.29 CHRONIC LEFT-SIDED LOW BACK PAIN WITH LEFT-SIDED SCIATICA: Primary | ICD-10-CM

## 2023-08-12 LAB
ANION GAP SERPL CALCULATED.3IONS-SCNC: 14 MMOL/L (ref 9–17)
BACTERIA URNS QL MICRO: ABNORMAL
BASOPHILS # BLD: <0.03 K/UL (ref 0–0.2)
BASOPHILS NFR BLD: 0 % (ref 0–2)
BILIRUB UR QL STRIP: NEGATIVE
BUN SERPL-MCNC: 28 MG/DL (ref 8–23)
BUN/CREAT SERPL: 22 (ref 9–20)
CALCIUM SERPL-MCNC: 9.3 MG/DL (ref 8.6–10.4)
CHLORIDE SERPL-SCNC: 100 MMOL/L (ref 98–107)
CLARITY UR: CLEAR
CO2 SERPL-SCNC: 22 MMOL/L (ref 20–31)
COLOR UR: YELLOW
CREAT SERPL-MCNC: 1.3 MG/DL (ref 0.5–0.9)
EOSINOPHIL # BLD: 0.07 K/UL (ref 0–0.44)
EOSINOPHILS RELATIVE PERCENT: 1 % (ref 1–4)
EPI CELLS #/AREA URNS HPF: ABNORMAL /HPF (ref 0–5)
ERYTHROCYTE [DISTWIDTH] IN BLOOD BY AUTOMATED COUNT: 13.2 % (ref 11.8–14.4)
GFR SERPL CREATININE-BSD FRML MDRD: 43 ML/MIN/1.73M2
GLUCOSE SERPL-MCNC: 266 MG/DL (ref 70–99)
GLUCOSE UR STRIP-MCNC: ABNORMAL MG/DL
HCT VFR BLD AUTO: 41 % (ref 36.3–47.1)
HGB BLD-MCNC: 13.8 G/DL (ref 11.9–15.1)
HGB UR QL STRIP.AUTO: ABNORMAL
IMM GRANULOCYTES # BLD AUTO: 0.04 K/UL (ref 0–0.3)
IMM GRANULOCYTES NFR BLD: 1 %
KETONES UR STRIP-MCNC: NEGATIVE MG/DL
LEUKOCYTE ESTERASE UR QL STRIP: NEGATIVE
LYMPHOCYTES NFR BLD: 3.19 K/UL (ref 1.1–3.7)
LYMPHOCYTES RELATIVE PERCENT: 42 % (ref 24–43)
MCH RBC QN AUTO: 31 PG (ref 25.2–33.5)
MCHC RBC AUTO-ENTMCNC: 33.7 G/DL (ref 28.4–34.8)
MCV RBC AUTO: 92.1 FL (ref 82.6–102.9)
MONOCYTES NFR BLD: 0.57 K/UL (ref 0.1–1.2)
MONOCYTES NFR BLD: 8 % (ref 3–12)
NEUTROPHILS NFR BLD: 48 % (ref 36–65)
NEUTS SEG NFR BLD: 3.72 K/UL (ref 1.5–8.1)
NITRITE UR QL STRIP: NEGATIVE
NRBC BLD-RTO: 0 PER 100 WBC
PH UR STRIP: 6 [PH] (ref 5–8)
PLATELET # BLD AUTO: 197 K/UL (ref 138–453)
PMV BLD AUTO: 11.3 FL (ref 8.1–13.5)
POTASSIUM SERPL-SCNC: 4.4 MMOL/L (ref 3.7–5.3)
PROT UR STRIP-MCNC: NEGATIVE MG/DL
RBC # BLD AUTO: 4.45 M/UL (ref 3.95–5.11)
RBC #/AREA URNS HPF: ABNORMAL /HPF (ref 0–2)
SODIUM SERPL-SCNC: 136 MMOL/L (ref 135–144)
SP GR UR STRIP: 1.01 (ref 1–1.03)
UROBILINOGEN UR STRIP-ACNC: NORMAL EU/DL (ref 0–1)
WBC #/AREA URNS HPF: ABNORMAL /HPF (ref 0–5)
WBC OTHER # BLD: 7.6 K/UL (ref 3.5–11.3)

## 2023-08-12 PROCEDURE — 6370000000 HC RX 637 (ALT 250 FOR IP)

## 2023-08-12 PROCEDURE — 81001 URINALYSIS AUTO W/SCOPE: CPT

## 2023-08-12 PROCEDURE — 74176 CT ABD & PELVIS W/O CONTRAST: CPT

## 2023-08-12 PROCEDURE — 80048 BASIC METABOLIC PNL TOTAL CA: CPT

## 2023-08-12 PROCEDURE — 2580000003 HC RX 258

## 2023-08-12 PROCEDURE — 73030 X-RAY EXAM OF SHOULDER: CPT

## 2023-08-12 PROCEDURE — 85025 COMPLETE CBC W/AUTO DIFF WBC: CPT

## 2023-08-12 PROCEDURE — 99284 EMERGENCY DEPT VISIT MOD MDM: CPT

## 2023-08-12 RX ORDER — HYDROCODONE BITARTRATE AND ACETAMINOPHEN 5; 325 MG/1; MG/1
1 TABLET ORAL EVERY 6 HOURS PRN
Qty: 12 TABLET | Refills: 0 | Status: SHIPPED | OUTPATIENT
Start: 2023-08-12 | End: 2023-08-15

## 2023-08-12 RX ORDER — ACETAMINOPHEN 500 MG
1000 TABLET ORAL ONCE
Status: COMPLETED | OUTPATIENT
Start: 2023-08-12 | End: 2023-08-12

## 2023-08-12 RX ORDER — 0.9 % SODIUM CHLORIDE 0.9 %
500 INTRAVENOUS SOLUTION INTRAVENOUS ONCE
Status: COMPLETED | OUTPATIENT
Start: 2023-08-12 | End: 2023-08-12

## 2023-08-12 RX ORDER — TIZANIDINE 4 MG/1
4 TABLET ORAL EVERY 6 HOURS PRN
Qty: 40 TABLET | Refills: 0 | Status: SHIPPED | OUTPATIENT
Start: 2023-08-12 | End: 2023-08-22

## 2023-08-12 RX ADMIN — ACETAMINOPHEN 1000 MG: 500 TABLET ORAL at 20:20

## 2023-08-12 RX ADMIN — SODIUM CHLORIDE 500 ML: 9 INJECTION, SOLUTION INTRAVENOUS at 20:37

## 2023-08-12 ASSESSMENT — PAIN SCALES - GENERAL
PAINLEVEL_OUTOF10: 8
PAINLEVEL_OUTOF10: 8
PAINLEVEL_OUTOF10: 6

## 2023-08-12 ASSESSMENT — ENCOUNTER SYMPTOMS
VOMITING: 0
NAUSEA: 0
DIARRHEA: 0
CHEST TIGHTNESS: 0
ABDOMINAL PAIN: 0
SHORTNESS OF BREATH: 0
BACK PAIN: 1
COUGH: 0
CONSTIPATION: 0

## 2023-08-12 ASSESSMENT — PAIN - FUNCTIONAL ASSESSMENT: PAIN_FUNCTIONAL_ASSESSMENT: 0-10

## 2023-08-13 NOTE — DISCHARGE INSTRUCTIONS
Call today or tomorrow to follow up with Shashi Bran and schedule follow-up appointment with orthopedic surgeon on Monday, Dr. Usha Cardozo for further evaluation of your back pain and sciatica. Use an ice pack or bag filled with ice and apply to the injured area 3 - 4 times a day for 15 - 20 minutes each time. If the injury is older than 3 days, then use a heating pad to help relax the muscles in your back. Use ibuprofen or Tylenol (unless prescribed medications that have Tylenol in it) for pain. You can take over the counter Ibuprofen (advil) tablets (4 tablets every 8 hours or 3 tablets every 6 hours or 2 tablets every 4 hours). Take the zanaflex and norco as prescribed. Separate thes medications by at least four hours as they can make your sleepy, tired. WARNING:  May cause drowsiness. May impair ability to operate vehicles or machinery. Do not use in combination with alcohol. Continue using lidocaine patches you have at home to the affected area, 12 hours on 12 hours off. No more than 3 patches at one time. Saturnino' Flexion Exercises     1. Pelvic tilt. Lie on your back with knees bent, feet flat on floor. Flatten the small of your back against the floor, without pushing down with the legs. Hold for 5 to 10 seconds. 2. Single Knee to chest. Lie on your back with knees bent and feet flat on the floor. Slowly pull your right knee toward your shoulder and hold 5 to 10 seconds. Lower the knee and repeat with the other knee. 3. Double knee to chest. Begin as in the previous exercise. After pulling right knee to chest, pull left knee to chest and hold both knees for 5 to 10 seconds. Slowly lower one leg at a time. 4. Partial sit-up. Do the pelvic tilt (exercise 1) and, while holding this position, slowly curl your head and shoulders off the floor. Hold briefly. Return slowly to the starting position. 5. Hamstring stretch.  Start in long sitting with toes directed toward the

## 2023-08-13 NOTE — ED NOTES
Pt states she has had right sided shoulder pain 7xmonths, pt hurt her shoulder carrying in water bottles.  Dr notified      Cornell Chu RN  08/12/23 8720

## 2023-08-24 ENCOUNTER — HOSPITAL ENCOUNTER (OUTPATIENT)
Dept: VASCULAR LAB | Age: 73
Discharge: HOME | End: 2023-08-26
Attending: SURGERY
Payer: MEDICARE

## 2023-08-24 DIAGNOSIS — I73.9 PVD (PERIPHERAL VASCULAR DISEASE) (HCC): ICD-10-CM

## 2023-08-24 DIAGNOSIS — Z95.828 STATUS POST BYPASS GRAFT OF EXTREMITY: ICD-10-CM

## 2023-08-24 DIAGNOSIS — I65.23 BILATERAL CAROTID ARTERY STENOSIS: ICD-10-CM

## 2023-08-24 LAB
VAS LEFT ABI: 0.84
VAS LEFT ANKLE BP: 122 MMHG
VAS LEFT ARM BP: 146 MMHG
VAS LEFT BULB EDV: 22.5 CM/S
VAS LEFT BULB PSV: 72.7 CM/S
VAS LEFT CCA DIST EDV: 16 CM/S
VAS LEFT CCA DIST PSV: 54.9 CM/S
VAS LEFT CCA PROX EDV: 17.6 CM/S
VAS LEFT CCA PROX PSV: 92.1 CM/S
VAS LEFT DORSALIS PEDIS BP: 122 MMHG
VAS LEFT ECA EDV: 9.55 CM/S
VAS LEFT ECA PSV: 82.4 CM/S
VAS LEFT ICA DIST EDV: 37.1 CM/S
VAS LEFT ICA DIST PSV: 95.3 CM/S
VAS LEFT ICA PROX EDV: 35.4 CM/S
VAS LEFT ICA PROX PSV: 93.7 CM/S
VAS LEFT ICA/CCA PSV: 1.74 NO UNITS
VAS LEFT PTA BP: 112 MMHG
VAS LEFT TBI: 0.77
VAS LEFT TOE PRESSURE: 112 MMHG
VAS LEFT VERTEBRAL EDV: 12.71 CM/S
VAS LEFT VERTEBRAL PSV: 34.1 CM/S
VAS RIGHT ABI: 1.1
VAS RIGHT ANKLE BP: 161 MMHG
VAS RIGHT ARM BP: 143 MMHG
VAS RIGHT BULB EDV: 13.1 CM/S
VAS RIGHT BULB PSV: 60.4 CM/S
VAS RIGHT CCA DIST EDV: 10.9 CM/S
VAS RIGHT CCA DIST PSV: 60.4 CM/S
VAS RIGHT CCA PROX EDV: 9.8 CM/S
VAS RIGHT CCA PROX PSV: 59.3 CM/S
VAS RIGHT DORSALIS PEDIS BP: 161 MMHG
VAS RIGHT ECA EDV: 0 CM/S
VAS RIGHT ECA PSV: 96.9 CM/S
VAS RIGHT ICA DIST EDV: 16.1 CM/S
VAS RIGHT ICA DIST PSV: 47.7 CM/S
VAS RIGHT ICA PROX EDV: 18.3 CM/S
VAS RIGHT ICA PROX PSV: 46.2 CM/S
VAS RIGHT ICA/CCA PSV: 1 NO UNITS
VAS RIGHT PTA BP: 149 MMHG
VAS RIGHT TBI: 0.51
VAS RIGHT TOE PRESSURE: 74 MMHG
VAS RIGHT VERTEBRAL EDV: 7.05 CM/S
VAS RIGHT VERTEBRAL PSV: 24.1 CM/S

## 2023-08-24 PROCEDURE — 93926 LOWER EXTREMITY STUDY: CPT

## 2023-08-24 PROCEDURE — 93923 UPR/LXTR ART STDY 3+ LVLS: CPT | Performed by: SURGERY

## 2023-08-24 PROCEDURE — 93923 UPR/LXTR ART STDY 3+ LVLS: CPT

## 2023-08-24 PROCEDURE — 93880 EXTRACRANIAL BILAT STUDY: CPT

## 2023-08-24 PROCEDURE — 93926 LOWER EXTREMITY STUDY: CPT | Performed by: SURGERY

## 2023-08-24 PROCEDURE — 93880 EXTRACRANIAL BILAT STUDY: CPT | Performed by: SURGERY

## 2024-10-04 ENCOUNTER — HOSPITAL ENCOUNTER (OUTPATIENT)
Dept: VASCULAR LAB | Age: 74
Discharge: HOME | End: 2024-10-06
Attending: SURGERY
Payer: MEDICARE

## 2024-10-04 DIAGNOSIS — I73.9 PERIPHERAL VASCULAR DISEASE, UNSPECIFIED (HCC): ICD-10-CM

## 2024-10-04 PROCEDURE — 93925 LOWER EXTREMITY STUDY: CPT

## 2024-10-04 PROCEDURE — 93926 LOWER EXTREMITY STUDY: CPT

## 2024-10-05 LAB
VAS RIGHT ARTERIAL PROX ANASTOMOSIS EDV: 0 CM/S
VAS RIGHT ARTERIAL PROX ANASTOMOSIS PSV: 73.2 CM/S
VAS RIGHT DIST OUTFLOW EDV: 6.1 CM/S
VAS RIGHT DIST OUTFLOW PSV: 52.4 CM/S
VAS RIGHT INFLOW ARTERY EDV: 15.5 CM/S
VAS RIGHT INFLOW ARTERY PSV: 132.2 CM/S
VAS RIGHT MID OUTFLOW EDV: 6.1 CM/S
VAS RIGHT MID OUTFLOW PSV: 52 CM/S
VAS RIGHT OUTFLOW VESSEL EDV: 12.7 CM/S
VAS RIGHT OUTFLOW VESSEL PSV: 74.3 CM/S
VAS RIGHT PROX OUTFLOW EDV: 7.3 CM/S
VAS RIGHT PROX OUTFLOW PSV: 47.9 CM/S
VAS RIGHT VENOUS DIST ANASTOMOSIS EDV: 0 CM/S
VAS RIGHT VENOUS DIST ANASTOMOSIS PSV: 58.4 CM/S

## 2024-10-05 PROCEDURE — 93926 LOWER EXTREMITY STUDY: CPT | Performed by: STUDENT IN AN ORGANIZED HEALTH CARE EDUCATION/TRAINING PROGRAM

## 2024-10-07 ENCOUNTER — HOSPITAL ENCOUNTER (OUTPATIENT)
Dept: VASCULAR LAB | Age: 74
Discharge: HOME | End: 2024-10-09
Attending: SURGERY
Payer: MEDICARE

## 2024-10-07 DIAGNOSIS — I73.9 PERIPHERAL VASCULAR DISEASE, UNSPECIFIED (HCC): ICD-10-CM

## 2024-10-07 DIAGNOSIS — I65.23 OCCLUSION AND STENOSIS OF BILATERAL CAROTID ARTERIES: ICD-10-CM

## 2024-10-07 LAB
VAS LEFT ABI: 0.53
VAS LEFT ANKLE BP: 67 MMHG
VAS LEFT ARM BP: 126 MMHG
VAS LEFT BULB EDV: 11.3 CM/S
VAS LEFT BULB PSV: 44.4 CM/S
VAS LEFT CCA DIST EDV: 20.9 CM/S
VAS LEFT CCA DIST PSV: 74.3 CM/S
VAS LEFT CCA PROX EDV: 25.7 CM/S
VAS LEFT CCA PROX PSV: 100.2 CM/S
VAS LEFT DORSALIS PEDIS BP: 67 MMHG
VAS LEFT ECA EDV: 12.78 CM/S
VAS LEFT ECA PSV: 95.3 CM/S
VAS LEFT ICA DIST EDV: 40.3 CM/S
VAS LEFT ICA DIST PSV: 96.9 CM/S
VAS LEFT ICA PROX EDV: 24.1 CM/S
VAS LEFT ICA PROX PSV: 72.7 CM/S
VAS LEFT ICA/CCA PSV: 1.31 NO UNITS
VAS LEFT PTA BP: 64 MMHG
VAS LEFT TBI: 0.42
VAS LEFT TOE PRESSURE: 53 MMHG
VAS LEFT VERTEBRAL EDV: 10.39 CM/S
VAS LEFT VERTEBRAL PSV: 35.7 CM/S
VAS RIGHT ABI: 1.17
VAS RIGHT ANKLE BP: 147 MMHG
VAS RIGHT ARM BP: 126 MMHG
VAS RIGHT BULB EDV: 7.6 CM/S
VAS RIGHT BULB PSV: 40.6 CM/S
VAS RIGHT CCA DIST EDV: 17.5 CM/S
VAS RIGHT CCA DIST PSV: 67 CM/S
VAS RIGHT CCA PROX EDV: 13.1 CM/S
VAS RIGHT CCA PROX PSV: 41.7 CM/S
VAS RIGHT DORSALIS PEDIS BP: 147 MMHG
VAS RIGHT ECA EDV: 14.11 CM/S
VAS RIGHT ECA PSV: 85.3 CM/S
VAS RIGHT ICA DIST EDV: 17.3 CM/S
VAS RIGHT ICA DIST PSV: 41.5 CM/S
VAS RIGHT ICA PROX EDV: 21.9 CM/S
VAS RIGHT ICA PROX PSV: 56.9 CM/S
VAS RIGHT ICA/CCA PSV: 0.9 NO UNITS
VAS RIGHT PTA BP: 144 MMHG
VAS RIGHT TBI: 0.99
VAS RIGHT TOE PRESSURE: 125 MMHG
VAS RIGHT VERTEBRAL EDV: 10.39 CM/S
VAS RIGHT VERTEBRAL PSV: 32.2 CM/S

## 2024-10-07 PROCEDURE — 93880 EXTRACRANIAL BILAT STUDY: CPT

## 2024-10-07 PROCEDURE — 93922 UPR/L XTREMITY ART 2 LEVELS: CPT

## 2025-06-03 ENCOUNTER — HOSPITAL ENCOUNTER (EMERGENCY)
Age: 75
Discharge: HOME | End: 2025-06-04
Payer: MEDICARE

## 2025-06-03 ENCOUNTER — APPOINTMENT (OUTPATIENT)
Dept: GENERAL RADIOLOGY | Age: 75
End: 2025-06-03
Payer: MEDICARE

## 2025-06-03 VITALS
WEIGHT: 190 LBS | SYSTOLIC BLOOD PRESSURE: 158 MMHG | DIASTOLIC BLOOD PRESSURE: 71 MMHG | RESPIRATION RATE: 20 BRPM | BODY MASS INDEX: 33.66 KG/M2 | OXYGEN SATURATION: 93 % | TEMPERATURE: 98.2 F | HEART RATE: 79 BPM | HEIGHT: 63 IN

## 2025-06-03 DIAGNOSIS — M62.830 BACK SPASM: Primary | ICD-10-CM

## 2025-06-03 PROCEDURE — 99284 EMERGENCY DEPT VISIT MOD MDM: CPT

## 2025-06-03 PROCEDURE — 96372 THER/PROPH/DIAG INJ SC/IM: CPT

## 2025-06-03 PROCEDURE — 72070 X-RAY EXAM THORAC SPINE 2VWS: CPT

## 2025-06-03 PROCEDURE — 6360000002 HC RX W HCPCS

## 2025-06-03 PROCEDURE — 6370000000 HC RX 637 (ALT 250 FOR IP)

## 2025-06-03 RX ORDER — DIAZEPAM 5 MG/1
5 TABLET ORAL ONCE
Status: COMPLETED | OUTPATIENT
Start: 2025-06-03 | End: 2025-06-03

## 2025-06-03 RX ORDER — LIDOCAINE 4 G/G
1 PATCH TOPICAL ONCE
Status: DISCONTINUED | OUTPATIENT
Start: 2025-06-03 | End: 2025-06-04 | Stop reason: HOSPADM

## 2025-06-03 RX ORDER — MORPHINE SULFATE 4 MG/ML
4 INJECTION, SOLUTION INTRAMUSCULAR; INTRAVENOUS ONCE
Status: COMPLETED | OUTPATIENT
Start: 2025-06-03 | End: 2025-06-03

## 2025-06-03 RX ORDER — ORPHENADRINE CITRATE 30 MG/ML
60 INJECTION INTRAMUSCULAR; INTRAVENOUS ONCE
Status: DISCONTINUED | OUTPATIENT
Start: 2025-06-03 | End: 2025-06-03

## 2025-06-03 RX ADMIN — MORPHINE SULFATE 4 MG: 4 INJECTION, SOLUTION INTRAMUSCULAR; INTRAVENOUS at 22:53

## 2025-06-03 RX ADMIN — DIAZEPAM 5 MG: 5 TABLET ORAL at 22:53

## 2025-06-03 ASSESSMENT — PAIN DESCRIPTION - DESCRIPTORS: DESCRIPTORS: DISCOMFORT

## 2025-06-03 ASSESSMENT — PAIN DESCRIPTION - LOCATION
LOCATION: BACK
LOCATION: BACK

## 2025-06-03 ASSESSMENT — PAIN SCALES - GENERAL
PAINLEVEL_OUTOF10: 10
PAINLEVEL_OUTOF10: 10

## 2025-06-03 ASSESSMENT — PAIN DESCRIPTION - ORIENTATION: ORIENTATION: LOWER

## 2025-06-03 ASSESSMENT — PAIN - FUNCTIONAL ASSESSMENT: PAIN_FUNCTIONAL_ASSESSMENT: 0-10

## 2025-06-04 PROCEDURE — 6370000000 HC RX 637 (ALT 250 FOR IP)

## 2025-06-04 RX ORDER — LIDOCAINE 50 MG/G
1 PATCH TOPICAL DAILY
Qty: 10 PATCH | Refills: 0 | Status: SHIPPED | OUTPATIENT
Start: 2025-06-04 | End: 2025-06-14

## 2025-06-04 RX ORDER — OXYCODONE AND ACETAMINOPHEN 5; 325 MG/1; MG/1
1 TABLET ORAL
Refills: 0 | Status: COMPLETED | OUTPATIENT
Start: 2025-06-04 | End: 2025-06-04

## 2025-06-04 RX ORDER — HYDROCODONE BITARTRATE AND ACETAMINOPHEN 5; 325 MG/1; MG/1
1 TABLET ORAL EVERY 6 HOURS PRN
Qty: 8 TABLET | Refills: 0 | Status: SHIPPED | OUTPATIENT
Start: 2025-06-04 | End: 2025-06-07

## 2025-06-04 RX ADMIN — OXYCODONE AND ACETAMINOPHEN 1 TABLET: 5; 325 TABLET ORAL at 01:07

## 2025-06-04 ASSESSMENT — PAIN SCALES - GENERAL: PAINLEVEL_OUTOF10: 7

## 2025-06-04 ASSESSMENT — PAIN DESCRIPTION - DESCRIPTORS: DESCRIPTORS: DISCOMFORT

## 2025-06-04 ASSESSMENT — PAIN DESCRIPTION - LOCATION: LOCATION: BACK

## 2025-06-04 NOTE — ED NOTES
Pt ambulated pushing the wheelchair to and from restroom without complications. Writer walked with pt with no complications noted.

## 2025-06-04 NOTE — ED NOTES
Pt arrived to ed with c/o right sided middle to lower back that started yesterday after she hurt herself while messing with the lawnmower. Pt states she was trying to mow the lawn and do things today and pain became severe in nature and was sharp, excruciating pain. No visible signs of injury. Pt has pain with movement and bending at the trunk. Pt denies any numbness to feet or loss of control of urine or bowels. Respirations non labored. Pt A&Ox4.

## 2025-06-07 ASSESSMENT — ENCOUNTER SYMPTOMS
BACK PAIN: 1
ABDOMINAL PAIN: 0
VOMITING: 0
NAUSEA: 0
SHORTNESS OF BREATH: 0

## 2025-06-07 NOTE — ED PROVIDER NOTES
(36.8 °C) (Oral)   Resp 20   Ht 1.6 m (5' 3\")   Wt 86.2 kg (190 lb)   SpO2 93%   BMI 33.66 kg/m²     Physical Exam  Vitals and nursing note reviewed.   Constitutional:       General: She is not in acute distress.     Appearance: Normal appearance. She is not ill-appearing, toxic-appearing or diaphoretic.   HENT:      Head: Normocephalic and atraumatic.   Eyes:      Extraocular Movements: Extraocular movements intact.      Pupils: Pupils are equal, round, and reactive to light.   Cardiovascular:      Rate and Rhythm: Normal rate and regular rhythm.      Pulses: Normal pulses.      Heart sounds: Normal heart sounds. No murmur heard.  Pulmonary:      Effort: Pulmonary effort is normal. No respiratory distress.      Breath sounds: Normal breath sounds. No wheezing or rales.   Abdominal:      General: Abdomen is flat.      Palpations: Abdomen is soft.      Tenderness: There is no abdominal tenderness.   Musculoskeletal:        Arms:    Skin:     General: Skin is warm and dry.      Capillary Refill: Capillary refill takes less than 2 seconds.   Neurological:      General: No focal deficit present.      Mental Status: She is alert and oriented to person, place, and time. Mental status is at baseline.      GCS: GCS eye subscore is 4. GCS verbal subscore is 5. GCS motor subscore is 6.      Cranial Nerves: Cranial nerves 2-12 are intact.      Sensory: Sensation is intact.      Motor: Motor function is intact.           DDX/DIAGNOSTIC RESULTS / EMERGENCY DEPARTMENT COURSE / MDM     Medical Decision Making  74-year-old presenting for back pain.  Vitals show hypertension.  Patient does not appear ill.  She is experiencing significant pain in her right thoracic back.  Exam noted above.  Neurologically intact no deficits no deformities.  No skin changes.  Symptoms consistent with back spasms.  X-ray performed showing no acute abnormality.  She did feel better after pain medication and muscle relaxers.  Will plan for

## 2025-07-25 ENCOUNTER — HOSPITAL ENCOUNTER (EMERGENCY)
Age: 75
Discharge: HOME | End: 2025-07-25
Attending: EMERGENCY MEDICINE
Payer: MEDICARE

## 2025-07-25 ENCOUNTER — APPOINTMENT (OUTPATIENT)
Dept: GENERAL RADIOLOGY | Age: 75
End: 2025-07-25
Payer: MEDICARE

## 2025-07-25 ENCOUNTER — APPOINTMENT (OUTPATIENT)
Dept: CT IMAGING | Age: 75
End: 2025-07-25
Payer: MEDICARE

## 2025-07-25 VITALS
DIASTOLIC BLOOD PRESSURE: 81 MMHG | RESPIRATION RATE: 18 BRPM | HEART RATE: 86 BPM | OXYGEN SATURATION: 92 % | SYSTOLIC BLOOD PRESSURE: 109 MMHG | TEMPERATURE: 98.9 F

## 2025-07-25 DIAGNOSIS — R05.1 ACUTE COUGH: ICD-10-CM

## 2025-07-25 DIAGNOSIS — R79.89 ELEVATED D-DIMER: ICD-10-CM

## 2025-07-25 DIAGNOSIS — R06.00 DYSPNEA, UNSPECIFIED TYPE: Primary | ICD-10-CM

## 2025-07-25 DIAGNOSIS — E04.1 THYROID NODULE: ICD-10-CM

## 2025-07-25 LAB
ANION GAP SERPL CALCULATED.3IONS-SCNC: 15 MMOL/L (ref 9–16)
BASOPHILS # BLD: <0.03 K/UL (ref 0–0.2)
BASOPHILS NFR BLD: 0 % (ref 0–2)
BNP SERPL-MCNC: 172 PG/ML (ref 0–450)
BUN SERPL-MCNC: 29 MG/DL (ref 8–23)
CALCIUM SERPL-MCNC: 9 MG/DL (ref 8.8–10.2)
CHLORIDE SERPL-SCNC: 99 MMOL/L (ref 98–107)
CHP ED QC CHECK: NORMAL
CO2 SERPL-SCNC: 21 MMOL/L (ref 20–31)
CREAT SERPL-MCNC: 1.2 MG/DL (ref 0.5–0.9)
D DIMER PPP FEU-MCNC: 0.63 UG/ML FEU (ref 0–0.59)
EOSINOPHIL # BLD: 0.08 K/UL (ref 0–0.44)
EOSINOPHILS RELATIVE PERCENT: 1 % (ref 1–4)
ERYTHROCYTE [DISTWIDTH] IN BLOOD BY AUTOMATED COUNT: 13.7 % (ref 11.8–14.4)
FLUAV RNA RESP QL NAA+PROBE: NOT DETECTED
FLUBV RNA RESP QL NAA+PROBE: NOT DETECTED
GFR, ESTIMATED: 48 ML/MIN/1.73M2
GLUCOSE BLD-MCNC: 151 MG/DL
GLUCOSE BLD-MCNC: 151 MG/DL (ref 65–105)
GLUCOSE SERPL-MCNC: 144 MG/DL (ref 82–115)
HCT VFR BLD AUTO: 37.5 % (ref 36.3–47.1)
HGB BLD-MCNC: 12.5 G/DL (ref 11.9–15.1)
IMM GRANULOCYTES # BLD AUTO: 0.06 K/UL (ref 0–0.3)
IMM GRANULOCYTES NFR BLD: 1 %
LYMPHOCYTES NFR BLD: 3.03 K/UL (ref 1.1–3.7)
LYMPHOCYTES RELATIVE PERCENT: 28 % (ref 24–43)
MAGNESIUM SERPL-MCNC: 2.2 MG/DL (ref 1.6–2.4)
MCH RBC QN AUTO: 30.3 PG (ref 25.2–33.5)
MCHC RBC AUTO-ENTMCNC: 33.3 G/DL (ref 28.4–34.8)
MCV RBC AUTO: 90.8 FL (ref 82.6–102.9)
MONOCYTES NFR BLD: 1.3 K/UL (ref 0.1–1.2)
MONOCYTES NFR BLD: 12 % (ref 3–12)
NEUTROPHILS NFR BLD: 58 % (ref 36–65)
NEUTS SEG NFR BLD: 6.47 K/UL (ref 1.5–8.1)
NRBC BLD-RTO: 0 PER 100 WBC
PLATELET # BLD AUTO: 186 K/UL (ref 138–453)
PMV BLD AUTO: 10.6 FL (ref 8.1–13.5)
POTASSIUM SERPL-SCNC: 3.9 MMOL/L (ref 3.7–5.3)
RBC # BLD AUTO: 4.13 M/UL (ref 3.95–5.11)
SARS-COV-2 RNA RESP QL NAA+PROBE: NOT DETECTED
SODIUM SERPL-SCNC: 136 MMOL/L (ref 136–145)
SPECIMEN DESCRIPTION: NORMAL
TROPONIN I SERPL HS-MCNC: 11 NG/L (ref 0–14)
WBC OTHER # BLD: 11 K/UL (ref 3.5–11.3)

## 2025-07-25 PROCEDURE — 71045 X-RAY EXAM CHEST 1 VIEW: CPT

## 2025-07-25 PROCEDURE — 83735 ASSAY OF MAGNESIUM: CPT

## 2025-07-25 PROCEDURE — 82947 ASSAY GLUCOSE BLOOD QUANT: CPT

## 2025-07-25 PROCEDURE — 83880 ASSAY OF NATRIURETIC PEPTIDE: CPT

## 2025-07-25 PROCEDURE — 99285 EMERGENCY DEPT VISIT HI MDM: CPT

## 2025-07-25 PROCEDURE — 2500000003 HC RX 250 WO HCPCS: Performed by: EMERGENCY MEDICINE

## 2025-07-25 PROCEDURE — 6360000004 HC RX CONTRAST MEDICATION: Performed by: EMERGENCY MEDICINE

## 2025-07-25 PROCEDURE — 85025 COMPLETE CBC W/AUTO DIFF WBC: CPT

## 2025-07-25 PROCEDURE — 2580000003 HC RX 258: Performed by: EMERGENCY MEDICINE

## 2025-07-25 PROCEDURE — 71260 CT THORAX DX C+: CPT

## 2025-07-25 PROCEDURE — 87636 SARSCOV2 & INF A&B AMP PRB: CPT

## 2025-07-25 PROCEDURE — 84484 ASSAY OF TROPONIN QUANT: CPT

## 2025-07-25 PROCEDURE — 85379 FIBRIN DEGRADATION QUANT: CPT

## 2025-07-25 PROCEDURE — 80048 BASIC METABOLIC PNL TOTAL CA: CPT

## 2025-07-25 RX ORDER — IOPAMIDOL 755 MG/ML
75 INJECTION, SOLUTION INTRAVASCULAR
Status: COMPLETED | OUTPATIENT
Start: 2025-07-25 | End: 2025-07-25

## 2025-07-25 RX ORDER — 0.9 % SODIUM CHLORIDE 0.9 %
100 INTRAVENOUS SOLUTION INTRAVENOUS ONCE
Status: COMPLETED | OUTPATIENT
Start: 2025-07-25 | End: 2025-07-25

## 2025-07-25 RX ORDER — AZITHROMYCIN 250 MG/1
TABLET, FILM COATED ORAL
Qty: 1 PACKET | Refills: 0 | Status: SHIPPED | OUTPATIENT
Start: 2025-07-25 | End: 2025-07-29

## 2025-07-25 RX ORDER — FLUTICASONE PROPIONATE 50 MCG
1 SPRAY, SUSPENSION (ML) NASAL DAILY PRN
Qty: 32 G | Refills: 1 | Status: SHIPPED | OUTPATIENT
Start: 2025-07-25

## 2025-07-25 RX ORDER — KETOROLAC TROMETHAMINE 15 MG/ML
15 INJECTION, SOLUTION INTRAMUSCULAR; INTRAVENOUS ONCE
Status: DISCONTINUED | OUTPATIENT
Start: 2025-07-25 | End: 2025-07-26 | Stop reason: HOSPADM

## 2025-07-25 RX ORDER — SODIUM CHLORIDE 0.9 % (FLUSH) 0.9 %
10 SYRINGE (ML) INJECTION PRN
Status: DISCONTINUED | OUTPATIENT
Start: 2025-07-25 | End: 2025-07-26 | Stop reason: HOSPADM

## 2025-07-25 RX ADMIN — SODIUM CHLORIDE, PRESERVATIVE FREE 10 ML: 5 INJECTION INTRAVENOUS at 20:35

## 2025-07-25 RX ADMIN — IOPAMIDOL 75 ML: 755 INJECTION, SOLUTION INTRAVENOUS at 20:35

## 2025-07-25 RX ADMIN — SODIUM CHLORIDE 100 ML: 9 INJECTION, SOLUTION INTRAVENOUS at 20:36

## 2025-07-25 ASSESSMENT — ENCOUNTER SYMPTOMS
SHORTNESS OF BREATH: 1
FACIAL SWELLING: 0
ABDOMINAL PAIN: 0
BACK PAIN: 0
ABDOMINAL DISTENTION: 0
CHEST TIGHTNESS: 0
EYE DISCHARGE: 0
EYE PAIN: 0
COUGH: 1

## 2025-07-25 NOTE — ED PROVIDER NOTES
EMERGENCY DEPARTMENT ENCOUNTER    Pt Name: Galilea Beaver  MRN: [id number]  Birthdate 1950  Date of evaluation: 7/25/25  CHIEF COMPLAINT       Chief Complaint   Patient presents with    Cough    Generalized Body Aches    Fever    Ear Fullness     Took Norco prior to arrival for pain       HISTORY OF PRESENT ILLNESS   HPI   Patient is a 75-year-old female with a history of diabetes hyperlipidemia hypertension who presented to the emergency department secondary to fever generalized bodyaches cough and ear fullness.  Patient's symptoms been ongoing for the last 1 week.  Symptoms started after going to a graduation party.  She did have a productive cough of yellowish-greenish sputum.  She had a previous history of COPD or asthma not on home oxygen.  No use of a diuretic.  No recent travel immobility.  She has a previous history of DVT.  She is on Eliquis, has been taking as prescribed has not missed any doses.  Patient denied sick contacts.  Patient denied nausea or vomiting.  She complains of bodyaches generalized fevers at home but did not take her temperature.  Patient denied chest pain, nausea or vomiting      REVIEW OF SYSTEMS     Review of Systems   Constitutional:  Negative for chills, diaphoresis and fever.   HENT:  Negative for congestion, ear pain and facial swelling.    Eyes:  Negative for pain, discharge and visual disturbance.   Respiratory:  Positive for cough and shortness of breath. Negative for chest tightness.    Cardiovascular:  Negative for chest pain and palpitations.   Gastrointestinal:  Negative for abdominal distention and abdominal pain.   Genitourinary:  Negative for difficulty urinating and flank pain.   Musculoskeletal:  Negative for back pain.   Skin:  Negative for wound.   Neurological:  Negative for dizziness, light-headedness and headaches.     PASTMEDICAL HISTORY     Past Medical History:   Diagnosis Date    Arthritis     Diabetes mellitus (HCC)     DVT (deep venous thrombosis) (HCC)

## 2025-07-26 LAB
EKG ATRIAL RATE: 88 BPM
EKG P AXIS: 46 DEGREES
EKG P-R INTERVAL: 168 MS
EKG Q-T INTERVAL: 354 MS
EKG QRS DURATION: 78 MS
EKG QTC CALCULATION (BAZETT): 428 MS
EKG R AXIS: 40 DEGREES
EKG T AXIS: 41 DEGREES
EKG VENTRICULAR RATE: 88 BPM

## 2025-07-26 NOTE — DISCHARGE INSTRUCTIONS
Take medications as prescribed.  Follow-up with your primary care physician to monitor for improvement and resolution of your symptoms.  Return back to the emergency department immediately if you notice any concerning or worsening signs or symptoms

## 2025-07-26 NOTE — ED PROVIDER NOTES
ADDENDUM:        Care of this patient was assumed from Dr. Alston    at   2137   .  The patient was seen for Cough, Generalized Body Aches, Fever, and Ear Fullness (Took Norco prior to arrival for pain/)  .  The patient's initial evaluation and plan have been discussed with the prior provider who initially evaluated the patient.  Nursing Notes, Past Medical Hx, Past Surgical Hx, Social Hx, Allergies, and Family Hx were all reviewed.      I performed a repeat evaluation of the patient and reviewed tests completed so far.        ED Course     ED Course as of 07/26/25 0243 Fri Jul 25, 2025 2137 Patient was signed out to me by the oug[place]ing physician Dr. Alston. Pending CTA to R/O PE  [AP]   2158 CT CHEST PULMONARY EMBOLISM W CONTRAST  IMPRESSION:  1. No pulmonary embolism or acute pulmonary abnormality.  2. Right thyroid nodule measuring 2.1 cm. Recommend non-emergent thyroid ultrasound.   [AP]      ED Course User Index  [AP] Cooper Turner MD       The patient was given the following medications:  Orders Placed This Encounter   Medications    sodium chloride 0.9 % bolus 100 mL    iopamidol (ISOVUE-370) 76 % injection 75 mL    sodium chloride flush 0.9 % injection 10 mL         Vitals Reviewed:    Vitals:    07/25/25 1804 07/25/25 1916 07/25/25 1931   BP: (!) 113/94 124/63 109/81   Pulse: 99 85 86   Resp: 18     Temp: 98.9 °F (37.2 °C)     SpO2: 94% 90% 92%       Initial Pain Score:    Last Pain Score:      RADIOLOGY:All plain film, CT, MRI, and formal ultrasound images (except ED bedside ultrasound) are read by the radiologist and the images and interpretations are directly viewed by the emergency physician.   XR CHEST PORTABLE   Final Result   1. No acute findings.   2. Right midlung zone 6 mm calcified granuloma.            CT CHEST PULMONARY EMBOLISM W CONTRAST    (Results Pending)         LABS: All lab results were reviewed by myself, and all abnormals are listed below.  Labs Reviewed   BASIC METABOLIC
